# Patient Record
Sex: FEMALE | Race: OTHER | HISPANIC OR LATINO | URBAN - METROPOLITAN AREA
[De-identification: names, ages, dates, MRNs, and addresses within clinical notes are randomized per-mention and may not be internally consistent; named-entity substitution may affect disease eponyms.]

---

## 2021-02-18 ENCOUNTER — EMERGENCY (EMERGENCY)
Facility: HOSPITAL | Age: 36
LOS: 1 days | Discharge: HOME | End: 2021-02-18
Attending: EMERGENCY MEDICINE | Admitting: EMERGENCY MEDICINE
Payer: MEDICAID

## 2021-02-18 VITALS
HEART RATE: 71 BPM | SYSTOLIC BLOOD PRESSURE: 113 MMHG | DIASTOLIC BLOOD PRESSURE: 75 MMHG | OXYGEN SATURATION: 100 % | RESPIRATION RATE: 18 BRPM | TEMPERATURE: 98 F

## 2021-02-18 VITALS
HEIGHT: 61 IN | TEMPERATURE: 98 F | HEART RATE: 76 BPM | WEIGHT: 179.9 LBS | DIASTOLIC BLOOD PRESSURE: 77 MMHG | OXYGEN SATURATION: 97 % | SYSTOLIC BLOOD PRESSURE: 120 MMHG | RESPIRATION RATE: 18 BRPM
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
## 2021-02-18 DIAGNOSIS — R10.31 RIGHT LOWER QUADRANT PAIN: ICD-10-CM

## 2021-02-18 DIAGNOSIS — S39.011A STRAIN OF MUSCLE, FASCIA AND TENDON OF ABDOMEN, INITIAL ENCOUNTER: ICD-10-CM
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
## 2021-02-18 LAB
ALBUMIN SERPL ELPH-MCNC: 3.8 G/DL — SIGNIFICANT CHANGE UP (ref 3.4–5)
ALP SERPL-CCNC: 83 U/L — SIGNIFICANT CHANGE UP (ref 40–120)
ALT FLD-CCNC: 37 U/L — SIGNIFICANT CHANGE UP (ref 12–42)
ANION GAP SERPL CALC-SCNC: 9 MMOL/L — SIGNIFICANT CHANGE UP (ref 9–16)
APPEARANCE UR: CLEAR — SIGNIFICANT CHANGE UP
APTT BLD: 31.2 SEC — SIGNIFICANT CHANGE UP (ref 27.5–35.5)
AST SERPL-CCNC: 31 U/L — SIGNIFICANT CHANGE UP (ref 15–37)
BASOPHILS # BLD AUTO: 0.03 K/UL — SIGNIFICANT CHANGE UP (ref 0–0.2)
BASOPHILS NFR BLD AUTO: 0.4 % — SIGNIFICANT CHANGE UP (ref 0–2)
BILIRUB SERPL-MCNC: 0.3 MG/DL — SIGNIFICANT CHANGE UP (ref 0.2–1.2)
BILIRUB UR-MCNC: NEGATIVE — SIGNIFICANT CHANGE UP
BUN SERPL-MCNC: 14 MG/DL — SIGNIFICANT CHANGE UP (ref 7–23)
CALCIUM SERPL-MCNC: 9.3 MG/DL — SIGNIFICANT CHANGE UP (ref 8.5–10.5)
CHLORIDE SERPL-SCNC: 104 MMOL/L — SIGNIFICANT CHANGE UP (ref 96–108)
CO2 SERPL-SCNC: 24 MMOL/L — SIGNIFICANT CHANGE UP (ref 22–31)
COLOR SPEC: YELLOW — SIGNIFICANT CHANGE UP
CREAT SERPL-MCNC: 0.76 MG/DL — SIGNIFICANT CHANGE UP (ref 0.5–1.3)
DIFF PNL FLD: NEGATIVE — SIGNIFICANT CHANGE UP
EOSINOPHIL # BLD AUTO: 0.21 K/UL — SIGNIFICANT CHANGE UP (ref 0–0.5)
EOSINOPHIL NFR BLD AUTO: 2.5 % — SIGNIFICANT CHANGE UP (ref 0–6)
GLUCOSE SERPL-MCNC: 115 MG/DL — HIGH (ref 70–99)
GLUCOSE UR QL: NEGATIVE — SIGNIFICANT CHANGE UP
HCG UR QL: NEGATIVE — SIGNIFICANT CHANGE UP
HCT VFR BLD CALC: 40.2 % — SIGNIFICANT CHANGE UP (ref 39–50)
HGB BLD-MCNC: 13.4 G/DL — SIGNIFICANT CHANGE UP (ref 13–17)
IMM GRANULOCYTES NFR BLD AUTO: 0.2 % — SIGNIFICANT CHANGE UP (ref 0–1.5)
INR BLD: 0.94 — SIGNIFICANT CHANGE UP (ref 0.88–1.16)
KETONES UR-MCNC: NEGATIVE — SIGNIFICANT CHANGE UP
LEUKOCYTE ESTERASE UR-ACNC: NEGATIVE — SIGNIFICANT CHANGE UP
LYMPHOCYTES # BLD AUTO: 2.66 K/UL — SIGNIFICANT CHANGE UP (ref 1–3.3)
LYMPHOCYTES # BLD AUTO: 32.1 % — SIGNIFICANT CHANGE UP (ref 13–44)
MCHC RBC-ENTMCNC: 30 PG — SIGNIFICANT CHANGE UP (ref 27–34)
MCHC RBC-ENTMCNC: 33.3 GM/DL — SIGNIFICANT CHANGE UP (ref 32–36)
MCV RBC AUTO: 90.1 FL — SIGNIFICANT CHANGE UP (ref 80–100)
MONOCYTES # BLD AUTO: 0.48 K/UL — SIGNIFICANT CHANGE UP (ref 0–0.9)
MONOCYTES NFR BLD AUTO: 5.8 % — SIGNIFICANT CHANGE UP (ref 2–14)
NEUTROPHILS # BLD AUTO: 4.89 K/UL — SIGNIFICANT CHANGE UP (ref 1.8–7.4)
NEUTROPHILS NFR BLD AUTO: 59 % — SIGNIFICANT CHANGE UP (ref 43–77)
NITRITE UR-MCNC: NEGATIVE — SIGNIFICANT CHANGE UP
NRBC # BLD: 0 /100 WBCS — SIGNIFICANT CHANGE UP (ref 0–0)
PH UR: 6 — SIGNIFICANT CHANGE UP (ref 5–8)
PLATELET # BLD AUTO: 225 K/UL — SIGNIFICANT CHANGE UP (ref 150–400)
POTASSIUM SERPL-MCNC: 3.9 MMOL/L — SIGNIFICANT CHANGE UP (ref 3.5–5.3)
POTASSIUM SERPL-SCNC: 3.9 MMOL/L — SIGNIFICANT CHANGE UP (ref 3.5–5.3)
PROT SERPL-MCNC: 7.7 G/DL — SIGNIFICANT CHANGE UP (ref 6.4–8.2)
PROT UR-MCNC: NEGATIVE MG/DL — SIGNIFICANT CHANGE UP
PROTHROM AB SERPL-ACNC: 11.3 SEC — SIGNIFICANT CHANGE UP (ref 10.6–13.6)
RBC # BLD: 4.46 M/UL — SIGNIFICANT CHANGE UP (ref 4.2–5.8)
RBC # FLD: 12.5 % — SIGNIFICANT CHANGE UP (ref 10.3–14.5)
SODIUM SERPL-SCNC: 137 MMOL/L — SIGNIFICANT CHANGE UP (ref 132–145)
SP GR SPEC: 1.02 — SIGNIFICANT CHANGE UP (ref 1–1.03)
UROBILINOGEN FLD QL: 0.2 E.U./DL — SIGNIFICANT CHANGE UP
WBC # BLD: 8.29 K/UL — SIGNIFICANT CHANGE UP (ref 3.8–10.5)
WBC # FLD AUTO: 8.29 K/UL — SIGNIFICANT CHANGE UP (ref 3.8–10.5)

## 2021-02-18 PROCEDURE — 99285 EMERGENCY DEPT VISIT HI MDM: CPT

## 2021-02-18 PROCEDURE — 76856 US EXAM PELVIC COMPLETE: CPT | Mod: 26,59

## 2021-02-18 PROCEDURE — 74177 CT ABD & PELVIS W/CONTRAST: CPT | Mod: 26

## 2021-02-18 PROCEDURE — 76830 TRANSVAGINAL US NON-OB: CPT | Mod: 26

## 2021-02-18 RX ORDER — ACETAMINOPHEN 500 MG
650 TABLET ORAL ONCE
Refills: 0 | Status: COMPLETED | OUTPATIENT
Start: 2021-02-18 | End: 2021-02-18

## 2021-02-18 RX ORDER — ONDANSETRON 8 MG/1
4 TABLET, FILM COATED ORAL ONCE
Refills: 0 | Status: COMPLETED | OUTPATIENT
Start: 2021-02-18 | End: 2021-02-18

## 2021-02-18 RX ORDER — SODIUM CHLORIDE 9 MG/ML
1000 INJECTION INTRAMUSCULAR; INTRAVENOUS; SUBCUTANEOUS ONCE
Refills: 0 | Status: COMPLETED | OUTPATIENT
Start: 2021-02-18 | End: 2021-02-18

## 2021-02-18 RX ORDER — IOHEXOL 300 MG/ML
30 INJECTION, SOLUTION INTRAVENOUS ONCE
Refills: 0 | Status: COMPLETED | OUTPATIENT
Start: 2021-02-18 | End: 2021-02-18

## 2021-02-18 RX ADMIN — SODIUM CHLORIDE 1000 MILLILITER(S): 9 INJECTION INTRAMUSCULAR; INTRAVENOUS; SUBCUTANEOUS at 13:23

## 2021-02-18 RX ADMIN — ONDANSETRON 4 MILLIGRAM(S): 8 TABLET, FILM COATED ORAL at 13:23

## 2021-02-18 RX ADMIN — Medication 650 MILLIGRAM(S): at 13:23

## 2021-02-18 RX ADMIN — IOHEXOL 30 MILLILITER(S): 300 INJECTION, SOLUTION INTRAVENOUS at 13:09

## 2021-02-18 NOTE — ED PROVIDER NOTE - OBJECTIVE STATEMENT
35 year old Female with no known PMHx presents to the ED c/o a sudden onset of RLQ pain that began while she was at work a few hours ago. Pain is described sharp and constant, and rated a 7/10 in severity. She initially thought the pain was hunger related but she did eat this morning at 9am. She reports mild nausea but no vomiting, changes in bowel or bladder habit, fever, vaginal bleeding, vaginal discharge, and history of abdominal surgeries.

## 2021-02-18 NOTE — ED PROVIDER NOTE - NSFOLLOWUPINSTRUCTIONS_ED_ALL_ED_FT
[employer]® CareNotes®     :  [facility]  	                       ABDOMINAL PAIN - AfterCare(R) Instructions(ER/ED)           Abdominal Pain    WHAT YOU NEED TO KNOW:    Abdominal pain can be dull, achy, or sharp. You may have pain in one area of your abdomen, or in your entire abdomen. Your pain may be caused by a condition such as constipation, food sensitivity or poisoning, infection, or a blockage. Abdominal pain can also be from a hernia, appendicitis, or an ulcer. Liver, gallbladder, or kidney conditions can also cause abdominal pain. The cause of your abdominal pain may be unknown.     DISCHARGE INSTRUCTIONS:    Return to the emergency department if:   •You have new chest pain or shortness of breath.      •You have pulsing pain in your upper abdomen or lower back that suddenly becomes constant.      •Your pain is in the right lower abdominal area and worsens with movement.       •You have a fever over 100.4°F (38°C) or shaking chills.       •You are vomiting and cannot keep food or liquids down.       •Your pain does not improve or gets worse over the next 8 to 12 hours.       •You see blood in your vomit or bowel movements, or they look black and tarry.       •Your skin or the whites of your eyes turn yellow.       •You are a woman and have a large amount of vaginal bleeding that is not your monthly period.       Contact your healthcare provider if:   •You have pain in your lower back.       •You are a man and have pain in your testicles.      •You have pain when you urinate.       •You have questions or concerns about your condition or care.      Follow up with your healthcare provider within 24 hours or as directed: Write down your questions so you remember to ask them during your visits.     Medicines:   •Medicines may be given to calm your stomach and prevent vomiting or to decrease pain. Ask how to take pain medicine safely.      •Take your medicine as directed. Contact your healthcare provider if you think your medicine is not helping or if you have side effects. Tell him of her if you are allergic to any medicine. Keep a list of the medicines, vitamins, and herbs you take. Include the amounts, and when and why you take them. Bring the list or the pill bottles to follow-up visits. Carry your medicine list with you in case of an emergency.         © Copyright [employer] 2021           back to top                          © Copyright [employer] 2021

## 2021-02-18 NOTE — ED ADULT TRIAGE NOTE - CHIEF COMPLAINT QUOTE
Patient to ED with complaint of RLQ abdominal pain beginning 2 hours PTA with nausea.  Denies vomiting and diarrhea.  LMP 12 days PTA.  No acute distress

## 2021-02-18 NOTE — ED PROVIDER NOTE - PROGRESS NOTE DETAILS
Patient reports symptoms improved, US negative. Discussed CT findings with patient. Informed her of incidental liver findings and she understands and will let PCP know. Based on lab results, and imaging results,  likely abdominal wall muscle strain. Advised heat to site, tylenol and ibuprofen.

## 2021-02-18 NOTE — ED PROVIDER NOTE - PATIENT PORTAL LINK FT
You can access the FollowMyHealth Patient Portal offered by [facility] by registering at the following website: [email or web address]. By joining [employer]’s FollowMyHealth portal, you will also be able to view your health information using other applications (apps) compatible with our system.

## 2021-02-18 NOTE — ED PROVIDER NOTE - CLINICAL SUMMARY MEDICAL DECISION MAKING FREE TEXT BOX
35 year old Female with sudden set of RLQ pain. Concerned for appendicitis, will r/o appendicitis. If negative for appendicitis or other intraabdominal process will order US for a torsion. Offered pain medication but patient is only requesting for Tylenol.

## 2021-02-20 LAB
CULTURE RESULTS: SIGNIFICANT CHANGE UP
SPECIMEN SOURCE: SIGNIFICANT CHANGE UP

## 2021-04-19 ENCOUNTER — EMERGENCY (EMERGENCY)
Facility: HOSPITAL | Age: 36
LOS: 1 days | Discharge: HOME | End: 2021-04-19
Attending: EMERGENCY MEDICINE | Admitting: EMERGENCY MEDICINE
Payer: MEDICAID

## 2021-04-19 VITALS
OXYGEN SATURATION: 100 % | DIASTOLIC BLOOD PRESSURE: 69 MMHG | SYSTOLIC BLOOD PRESSURE: 110 MMHG | HEART RATE: 64 BPM | TEMPERATURE: 99 F | RESPIRATION RATE: 16 BRPM

## 2021-04-19 VITALS
DIASTOLIC BLOOD PRESSURE: 85 MMHG | SYSTOLIC BLOOD PRESSURE: 123 MMHG | OXYGEN SATURATION: 98 % | RESPIRATION RATE: 18 BRPM | TEMPERATURE: 99 F | WEIGHT: 179.9 LBS | HEIGHT: 61 IN | HEART RATE: 55 BPM

## 2021-04-19 DIAGNOSIS — R05 COUGH: ICD-10-CM

## 2021-04-19 DIAGNOSIS — J98.01 ACUTE BRONCHOSPASM: ICD-10-CM

## 2021-04-19 DIAGNOSIS — U07.1 COVID-19: ICD-10-CM

## 2021-04-19 LAB
FLUAV H1 2009 PAND RNA SPEC QL NAA+PROBE: SIGNIFICANT CHANGE UP
FLUAV H1 RNA SPEC QL NAA+PROBE: SIGNIFICANT CHANGE UP
FLUAV H3 RNA SPEC QL NAA+PROBE: SIGNIFICANT CHANGE UP
FLUAV SUBTYP SPEC NAA+PROBE: SIGNIFICANT CHANGE UP
FLUBV RNA SPEC QL NAA+PROBE: SIGNIFICANT CHANGE UP
HCOV PNL SPEC NAA+PROBE: DETECTED
RAPID RVP RESULT: DETECTED
SARS-COV-2 RNA SPEC QL NAA+PROBE: SIGNIFICANT CHANGE UP

## 2021-04-19 PROCEDURE — 99285 EMERGENCY DEPT VISIT HI MDM: CPT | Mod: 25

## 2021-04-19 PROCEDURE — 71046 X-RAY EXAM CHEST 2 VIEWS: CPT | Mod: 26

## 2021-04-19 PROCEDURE — 93010 ELECTROCARDIOGRAM REPORT: CPT

## 2021-04-19 RX ORDER — ALBUTEROL 90 UG/1
2 AEROSOL, METERED ORAL
Qty: 1 | Refills: 0
Start: 2021-04-19 | End: 2021-05-18

## 2021-04-19 RX ORDER — CETIRIZINE HYDROCHLORIDE 10 MG/1
1 TABLET ORAL
Qty: 7 | Refills: 0
Start: 2021-04-19 | End: 2021-04-25

## 2021-04-19 RX ORDER — FLUTICASONE PROPIONATE AND SALMETEROL 50; 250 UG/1; UG/1
1 POWDER ORAL; RESPIRATORY (INHALATION)
Qty: 1 | Refills: 0
Start: 2021-04-19 | End: 2021-05-18

## 2021-04-19 RX ORDER — LORATADINE 10 MG/1
10 TABLET ORAL ONCE
Refills: 0 | Status: COMPLETED | OUTPATIENT
Start: 2021-04-19 | End: 2021-04-19

## 2021-04-19 RX ORDER — MONTELUKAST 4 MG/1
10 TABLET, CHEWABLE ORAL ONCE
Refills: 0 | Status: COMPLETED | OUTPATIENT
Start: 2021-04-19 | End: 2021-04-19

## 2021-04-19 RX ORDER — IPRATROPIUM/ALBUTEROL SULFATE 18-103MCG
3 AEROSOL WITH ADAPTER (GRAM) INHALATION
Refills: 0 | Status: COMPLETED | OUTPATIENT
Start: 2021-04-19 | End: 2021-04-19

## 2021-04-19 RX ADMIN — Medication 3 MILLILITER(S): at 17:55

## 2021-04-19 RX ADMIN — Medication 60 MILLIGRAM(S): at 17:58

## 2021-04-19 RX ADMIN — MONTELUKAST 10 MILLIGRAM(S): 4 TABLET, CHEWABLE ORAL at 17:58

## 2021-04-19 RX ADMIN — Medication 3 MILLILITER(S): at 17:40

## 2021-04-19 RX ADMIN — LORATADINE 10 MILLIGRAM(S): 10 TABLET ORAL at 17:58

## 2021-04-19 RX ADMIN — Medication 3 MILLILITER(S): at 17:58

## 2021-04-19 NOTE — ED PROVIDER NOTE - OBJECTIVE STATEMENT
35 F presenting with hacking dry cough x 3 weeks. She has had more than one neg COVID test and neg COVID antibodies with her PMD. She used a family members Albuterol neb and took a benadryl, that helped a bit for a short time. No hx of atopy or asthma but has a fam hx of asthma. No cigs. No f/c. No SOB.

## 2021-04-19 NOTE — ED PROVIDER NOTE - PATIENT PORTAL LINK FT
You can access the FollowMyHealth Patient Portal offered by [facility] by registering at the following website: [email or web address]. By joining [employer]’s FollowMyHealth portal, you will also be able to view your health information using other applications (apps) compatible with our system.

## 2021-04-19 NOTE — ED PROVIDER NOTE - NSFOLLOWUPINSTRUCTIONS_ED_ALL_ED_FT
You appear to have bronchospasm (like asthma symptoms).    You also tested positive for a non COVID 19 coronavirus, this is like a common cold.    You might also have some seasonal allergies.     We are prescribing a short course of prednisone, Advair, cetirizine. The are antiinflammatories, antiallergy and medications to open the airways.    Please bring copies of your please to your PMD.

## 2021-04-19 NOTE — ED ADULT TRIAGE NOTE - CHIEF COMPLAINT QUOTE
Pt complaining of cough and chest tightness x3 weeks. Pt states that she tested negative for COVID on 4/2 and quarantined for 10 days. Pt was prescribed singular by PCP with no improvement.

## 2021-04-19 NOTE — ED PROVIDER NOTE - CLINICAL SUMMARY MEDICAL DECISION MAKING FREE TEXT BOX
Patient presenting with couch- bronchospastic. CXR clear. Labs consistent with non-COVID coronavirus. Better after nebs.

## 2021-04-19 NOTE — ED PROVIDER NOTE - PHYSICAL EXAMINATION
VITAL SIGNS: I have reviewed nursing notes and confirm.  CONSTITUTIONAL: Well-developed; well-nourished; in no acute distress.  SKIN: Skin is warm and dry, no acute rash.  HEAD: Normocephalic; atraumatic.  EYES: Pupils round bilaterally, 3mm; conjunctiva and sclera clear.  ENT: No nasal discharge; airway clear, MMM.  NECK: Supple; non tender.  CARD: S1, S2 normal; no murmurs, gallops, or rubs. Regular rate and rhythm.  RESP: No wheezes with regualr resp, + faint wheeze with cough, rales or rhonchi.  ABD: Soft; non-distended; non-tender; no rebound or guarding.  EXT: Normal ROM. No clubbing, cyanosis or edema.  NEURO: Alert, oriented. Grossly unremarkable. KENNEDY, normal tone, no gross motor or sensory changes. Fluent speech.   PSYCH: Cooperative, appropriate. Mood and affect wnl.

## 2021-04-19 NOTE — ED PROVIDER NOTE - PROGRESS NOTE DETAILS
Labs wnl and xrays wnl, feeling better, will d/c. RVP + for non COVID coronavirus. Will d/c on inhalers/pred and allergy meds.

## 2022-05-03 NOTE — ED PROVIDER NOTE - RADIATION
Chief Complaint:  Fina R Jhonny is here today for   Chief Complaint   Patient presents with   • Toe Pain        Medications: medications verified, no change  Refills needed today?  NO  denies Latex allergy or sensitivity  Tobacco history: verified  Health Maintenance Due   Topic Date Due   • COVID-19 Vaccine (3 - Booster for Pfizer series) 12/30/2021     Patient is due for topics as listed above but is not proceeding with Immunization(s) COVID-19 at this time..  Health Maintenance Summary     COVID-19 Vaccine (3 - Booster for Pfizer series)  Overdue since 12/30/2021    Cervical Cancer Screen 30-64 - (PAP/HPV Co-Testing - Every 5 Years)  Next due on 10/15/2025    DTaP/Tdap/Td Vaccine (4 - Td or Tdap)  Next due on 9/17/2026    Hepatitis B Vaccine   Aged Out    Influenza Vaccine   Completed    Meningococcal Vaccine   Aged Out    HPV Vaccine   Aged Out    Pneumococcal Vaccine 0-64   Aged Out          Patient would like communication of their results via:        Cell Phone:   Telephone Information:   Mobile [phone]     Okay to leave a message containing results? Yes   no radiation

## 2022-07-06 ENCOUNTER — EMERGENCY (EMERGENCY)
Facility: HOSPITAL | Age: 37
LOS: 1 days | Discharge: HOME | End: 2022-07-06
Admitting: EMERGENCY MEDICINE

## 2022-07-06 VITALS
SYSTOLIC BLOOD PRESSURE: 113 MMHG | RESPIRATION RATE: 17 BRPM | OXYGEN SATURATION: 96 % | HEART RATE: 62 BPM | DIASTOLIC BLOOD PRESSURE: 80 MMHG | TEMPERATURE: 98 F

## 2022-07-06 VITALS
SYSTOLIC BLOOD PRESSURE: 126 MMHG | TEMPERATURE: 98 F | DIASTOLIC BLOOD PRESSURE: 81 MMHG | RESPIRATION RATE: 18 BRPM | HEIGHT: 61 IN | HEART RATE: 75 BPM | OXYGEN SATURATION: 97 %

## 2022-07-06 LAB
ALBUMIN SERPL ELPH-MCNC: 4 G/DL — SIGNIFICANT CHANGE UP (ref 3.4–5)
ALP SERPL-CCNC: 65 U/L — SIGNIFICANT CHANGE UP (ref 40–120)
ALT FLD-CCNC: 31 U/L — SIGNIFICANT CHANGE UP (ref 12–42)
ANION GAP SERPL CALC-SCNC: 9 MMOL/L — SIGNIFICANT CHANGE UP (ref 9–16)
APPEARANCE UR: CLEAR — SIGNIFICANT CHANGE UP
APTT BLD: 30.4 SEC — SIGNIFICANT CHANGE UP (ref 27.5–35.5)
AST SERPL-CCNC: 21 U/L — SIGNIFICANT CHANGE UP (ref 15–37)
BACTERIA # UR AUTO: PRESENT /HPF
BASOPHILS # BLD AUTO: 0.01 K/UL — SIGNIFICANT CHANGE UP (ref 0–0.2)
BASOPHILS NFR BLD AUTO: 0.1 % — SIGNIFICANT CHANGE UP (ref 0–2)
BILIRUB SERPL-MCNC: 0.5 MG/DL — SIGNIFICANT CHANGE UP (ref 0.2–1.2)
BILIRUB UR-MCNC: NEGATIVE — SIGNIFICANT CHANGE UP
BUN SERPL-MCNC: 15 MG/DL — SIGNIFICANT CHANGE UP (ref 7–23)
CALCIUM SERPL-MCNC: 9.2 MG/DL — SIGNIFICANT CHANGE UP (ref 8.5–10.5)
CHLORIDE SERPL-SCNC: 105 MMOL/L — SIGNIFICANT CHANGE UP (ref 96–108)
CO2 SERPL-SCNC: 24 MMOL/L — SIGNIFICANT CHANGE UP (ref 22–31)
COD CRY URNS QL: ABNORMAL /HPF
COLOR SPEC: YELLOW — SIGNIFICANT CHANGE UP
CREAT SERPL-MCNC: 0.68 MG/DL — SIGNIFICANT CHANGE UP (ref 0.5–1.3)
DIFF PNL FLD: ABNORMAL
EGFR: 116 ML/MIN/1.73M2 — SIGNIFICANT CHANGE UP
EOSINOPHIL # BLD AUTO: 0.08 K/UL — SIGNIFICANT CHANGE UP (ref 0–0.5)
EOSINOPHIL NFR BLD AUTO: 0.8 % — SIGNIFICANT CHANGE UP (ref 0–6)
EPI CELLS # UR: SIGNIFICANT CHANGE UP /HPF (ref 0–5)
GLUCOSE SERPL-MCNC: 85 MG/DL — SIGNIFICANT CHANGE UP (ref 70–99)
GLUCOSE UR QL: NEGATIVE — SIGNIFICANT CHANGE UP
HCG SERPL-ACNC: <1 MIU/ML — SIGNIFICANT CHANGE UP
HCT VFR BLD CALC: 40.3 % — SIGNIFICANT CHANGE UP (ref 34.5–45)
HGB BLD-MCNC: 13.6 G/DL — SIGNIFICANT CHANGE UP (ref 11.5–15.5)
IMM GRANULOCYTES NFR BLD AUTO: 0.3 % — SIGNIFICANT CHANGE UP (ref 0–1.5)
INR BLD: 0.97 — SIGNIFICANT CHANGE UP (ref 0.88–1.16)
KETONES UR-MCNC: 15 MG/DL
LEUKOCYTE ESTERASE UR-ACNC: NEGATIVE — SIGNIFICANT CHANGE UP
LYMPHOCYTES # BLD AUTO: 2.31 K/UL — SIGNIFICANT CHANGE UP (ref 1–3.3)
LYMPHOCYTES # BLD AUTO: 23.4 % — SIGNIFICANT CHANGE UP (ref 13–44)
MCHC RBC-ENTMCNC: 31.2 PG — SIGNIFICANT CHANGE UP (ref 27–34)
MCHC RBC-ENTMCNC: 33.7 GM/DL — SIGNIFICANT CHANGE UP (ref 32–36)
MCV RBC AUTO: 92.4 FL — SIGNIFICANT CHANGE UP (ref 80–100)
MONOCYTES # BLD AUTO: 0.6 K/UL — SIGNIFICANT CHANGE UP (ref 0–0.9)
MONOCYTES NFR BLD AUTO: 6.1 % — SIGNIFICANT CHANGE UP (ref 2–14)
NEUTROPHILS # BLD AUTO: 6.86 K/UL — SIGNIFICANT CHANGE UP (ref 1.8–7.4)
NEUTROPHILS NFR BLD AUTO: 69.3 % — SIGNIFICANT CHANGE UP (ref 43–77)
NITRITE UR-MCNC: NEGATIVE — SIGNIFICANT CHANGE UP
NRBC # BLD: 0 /100 WBCS — SIGNIFICANT CHANGE UP (ref 0–0)
PH UR: 6 — SIGNIFICANT CHANGE UP (ref 5–8)
PLATELET # BLD AUTO: 238 K/UL — SIGNIFICANT CHANGE UP (ref 150–400)
POTASSIUM SERPL-MCNC: 3.5 MMOL/L — SIGNIFICANT CHANGE UP (ref 3.5–5.3)
POTASSIUM SERPL-SCNC: 3.5 MMOL/L — SIGNIFICANT CHANGE UP (ref 3.5–5.3)
PROT SERPL-MCNC: 7.7 G/DL — SIGNIFICANT CHANGE UP (ref 6.4–8.2)
PROT UR-MCNC: NEGATIVE MG/DL — SIGNIFICANT CHANGE UP
PROTHROM AB SERPL-ACNC: 11.3 SEC — SIGNIFICANT CHANGE UP (ref 10.5–13.4)
RBC # BLD: 4.36 M/UL — SIGNIFICANT CHANGE UP (ref 3.8–5.2)
RBC # FLD: 12.8 % — SIGNIFICANT CHANGE UP (ref 10.3–14.5)
RBC CASTS # UR COMP ASSIST: < 5 /HPF — SIGNIFICANT CHANGE UP
SODIUM SERPL-SCNC: 138 MMOL/L — SIGNIFICANT CHANGE UP (ref 132–145)
SP GR SPEC: >=1.03 — SIGNIFICANT CHANGE UP (ref 1–1.03)
UROBILINOGEN FLD QL: 0.2 E.U./DL — SIGNIFICANT CHANGE UP
WBC # BLD: 9.89 K/UL — SIGNIFICANT CHANGE UP (ref 3.8–10.5)
WBC # FLD AUTO: 9.89 K/UL — SIGNIFICANT CHANGE UP (ref 3.8–10.5)
WBC UR QL: < 5 /HPF — SIGNIFICANT CHANGE UP

## 2022-07-06 PROCEDURE — 76856 US EXAM PELVIC COMPLETE: CPT | Mod: 26,59

## 2022-07-06 PROCEDURE — 99284 EMERGENCY DEPT VISIT MOD MDM: CPT

## 2022-07-06 PROCEDURE — 76830 TRANSVAGINAL US NON-OB: CPT | Mod: 26

## 2022-07-06 NOTE — ED PROVIDER NOTE - OBJECTIVE STATEMENT
38 yo F, denies pmhx, presenting w/ vaginal spotting and pelvic pain x 2 days. Pt states the pelvic pain is L sided. SHe has had similar symptoms in the past and has seen GYN for this issue, but not in the past few months. Pt denies nausea, vomiting, upper abdominal pain, urinary symptoms, headaches, dizziness, back pain, or chills.  LMP 2 weeks ago.

## 2022-07-06 NOTE — ED PROVIDER NOTE - CLINICAL SUMMARY MEDICAL DECISION MAKING FREE TEXT BOX
38 yo F, denies pmhx, presenting w/ vaginal spotting and pelvic pain x 2 days. Patient found to have L adnexal ttp on exam. Will obtain medical labs and send for pelvic US. Dispo pending medical w/u.

## 2022-07-06 NOTE — ED ADULT NURSE NOTE - OBJECTIVE STATEMENT
c/o abdominal cramping and vag spotting x 2 days. pt appears comfortable, in NAD. will continue to monitor. awaiting provider eval. LMP 2 weeks ago

## 2022-07-06 NOTE — ED PROVIDER NOTE - PROGRESS NOTE DETAILS
US shows free fluid in pelvis likely indicating ruptured ovarian cyst. Results d/w patient and she is given copy of report. She will f/u w/ her GYN. Return precautions discussed. Pt stable on DC.

## 2022-07-06 NOTE — ED ADULT TRIAGE NOTE - CHIEF COMPLAINT QUOTE
Pt walks in c/o R flank pain with vaginal spotting/hematuria for several days. Pt also c/o headache. LMP 6/18

## 2022-07-06 NOTE — ED PROVIDER NOTE - PATIENT PORTAL LINK FT
You can access the FollowMyHealth Patient Portal offered by [facility] by registering at the following website: [email or web address]. By joining [employer]’s FollowMyHealth portal, you will also be able to view your health information using other applications (apps) compatible with our system.

## 2022-07-06 NOTE — ED PROVIDER NOTE - NSFOLLOWUPINSTRUCTIONS_ED_ALL_ED_FT
Ovarian Cyst       An ovarian cyst is a fluid-filled sac that forms on an ovary. The ovaries are small organs that produce eggs in women. Various types of cysts can form on the ovaries. Some may cause symptoms and require treatment. Most ovarian cysts go away on their own, are not cancerous (are benign), and do not cause problems.      What are the causes?    Ovarian cysts may be caused by:  •Ovarian hyperstimulation syndrome. This is a condition that can develop from taking fertility medicines. It causes multiple large ovarian cysts to form.      •Polycystic ovarian syndrome (PCOS). This is a common hormonal disorder that can cause ovarian cysts to form, and can cause problems with your period or fertility.      •The normal menstrual cycle.        What increases the risk?    The following factors may make you more likely to develop this condition:  •Being overweight or obese.      •Taking fertility medicines.      •Taking certain forms of hormonal birth control.      •Smoking.        What are the signs or symptoms?    Many ovarian cysts do not cause symptoms. If symptoms are present, they may include:  •Pelvic pain or pressure.      •Pain in the lower abdomen.      •Pain during sex.      •Abdominal swelling.      •Abnormal menstrual periods.      •Increasing pain with menstrual periods.        How is this diagnosed?    These cysts are commonly found during a routine pelvic exam. You may have tests to find out more about the cyst, such as:  •Ultrasound.      •CT scan.      •MRI.      •Blood tests.        How is this treated?    Many ovarian cysts go away on their own without treatment. Your health care provider may want to check your cyst regularly for 2–3 months to see if it changes. If you are in menopause, it is especially important to have your cyst monitored closely because menopausal women have a higher rate of ovarian cancer.    When treatment is needed, it may include:  •Medicines to help relieve pain.      •A procedure to drain the cyst (aspiration).      •Surgery to remove the whole cyst (cystectomy).      •Hormone treatment or birth control pills. These methods are sometimes used to help keep cysts from coming back.      •Surgery to remove the ovary (oophorectomy).        Follow these instructions at home:    •Take over-the-counter and prescription medicines only as told by your health care provider.      •Ask your health care provider if any medicine prescribed to you requires you to avoid driving or using machinery.      •Get regular pelvic exams and Pap tests as often as told by your health care provider.      •Return to your normal activities as told by your health care provider. Ask your health care provider what activities are safe for you.      • Do not use any products that contain nicotine or tobacco, such as cigarettes, e-cigarettes, and chewing tobacco. If you need help quitting, ask your health care provider.      •Keep all follow-up visits. This is important.        Contact a health care provider if:    •Your periods are late, irregular, painful, or they stop.      •You have pelvic pain that does not go away.      •You have pressure on your bladder or trouble emptying your bladder completely.    •You have any of the following:  •A feeling of fullness.      •You are gaining weight or losing weight without changing your exercise and eating habits.      •Pain, swelling, or bloating in the abdomen.      •Loss of appetite.      •Pain and pressure in your back and pelvis.        •You think you may be pregnant.        Get help right away if:    •You have abdominal or pelvic pain that is severe or gets worse.      •You cannot eat or drink without vomiting.      •You suddenly develop a fever or chills.      •Your menstrual period is much heavier than usual.        Summary    •An ovarian cyst is a fluid-filled sac that forms on an ovary.      •Some ovarian cysts may cause symptoms and require treatment.      •These cysts are commonly found during a routine pelvic exam.      •Many ovarian cysts go away on their own without treatment.      This information is not intended to replace advice given to you by your health care provider. Make sure you discuss any questions you have with your health care provider.

## 2022-07-07 DIAGNOSIS — R10.2 PELVIC AND PERINEAL PAIN: ICD-10-CM

## 2022-07-07 DIAGNOSIS — N83.202 UNSPECIFIED OVARIAN CYST, LEFT SIDE: ICD-10-CM

## 2022-07-07 DIAGNOSIS — N83.201 UNSPECIFIED OVARIAN CYST, RIGHT SIDE: ICD-10-CM

## 2022-07-07 PROBLEM — Z78.9 OTHER SPECIFIED HEALTH STATUS: Chronic | Status: ACTIVE | Noted: 2021-02-18

## 2022-07-08 LAB
CULTURE RESULTS: SIGNIFICANT CHANGE UP
SPECIMEN SOURCE: SIGNIFICANT CHANGE UP

## 2022-07-25 ENCOUNTER — EMERGENCY (EMERGENCY)
Facility: HOSPITAL | Age: 37
LOS: 1 days | Discharge: HOME | End: 2022-07-25
Admitting: EMERGENCY MEDICINE

## 2022-07-25 VITALS
TEMPERATURE: 98 F | HEART RATE: 60 BPM | SYSTOLIC BLOOD PRESSURE: 109 MMHG | HEIGHT: 61 IN | RESPIRATION RATE: 16 BRPM | OXYGEN SATURATION: 99 % | WEIGHT: 179.9 LBS | DIASTOLIC BLOOD PRESSURE: 76 MMHG

## 2022-07-25 LAB
ALBUMIN SERPL ELPH-MCNC: 3.6 G/DL — SIGNIFICANT CHANGE UP (ref 3.4–5)
ALP SERPL-CCNC: 66 U/L — SIGNIFICANT CHANGE UP (ref 40–120)
ALT FLD-CCNC: 22 U/L — SIGNIFICANT CHANGE UP (ref 12–42)
ANION GAP SERPL CALC-SCNC: 5 MMOL/L — LOW (ref 9–16)
APPEARANCE UR: CLEAR — SIGNIFICANT CHANGE UP
AST SERPL-CCNC: 18 U/L — SIGNIFICANT CHANGE UP (ref 15–37)
BASOPHILS # BLD AUTO: 0.02 K/UL — SIGNIFICANT CHANGE UP (ref 0–0.2)
BASOPHILS NFR BLD AUTO: 0.3 % — SIGNIFICANT CHANGE UP (ref 0–2)
BILIRUB SERPL-MCNC: 0.5 MG/DL — SIGNIFICANT CHANGE UP (ref 0.2–1.2)
BILIRUB UR-MCNC: ABNORMAL
BUN SERPL-MCNC: 17 MG/DL — SIGNIFICANT CHANGE UP (ref 7–23)
CALCIUM SERPL-MCNC: 8.7 MG/DL — SIGNIFICANT CHANGE UP (ref 8.5–10.5)
CHLORIDE SERPL-SCNC: 107 MMOL/L — SIGNIFICANT CHANGE UP (ref 96–108)
CO2 SERPL-SCNC: 27 MMOL/L — SIGNIFICANT CHANGE UP (ref 22–31)
COLOR SPEC: YELLOW — SIGNIFICANT CHANGE UP
CREAT SERPL-MCNC: 0.74 MG/DL — SIGNIFICANT CHANGE UP (ref 0.5–1.3)
DIFF PNL FLD: NEGATIVE — SIGNIFICANT CHANGE UP
EGFR: 108 ML/MIN/1.73M2 — SIGNIFICANT CHANGE UP
EOSINOPHIL # BLD AUTO: 0.11 K/UL — SIGNIFICANT CHANGE UP (ref 0–0.5)
EOSINOPHIL NFR BLD AUTO: 1.7 % — SIGNIFICANT CHANGE UP (ref 0–6)
GLUCOSE SERPL-MCNC: 94 MG/DL — SIGNIFICANT CHANGE UP (ref 70–99)
GLUCOSE UR QL: NEGATIVE — SIGNIFICANT CHANGE UP
HCG UR QL: NEGATIVE — SIGNIFICANT CHANGE UP
HCT VFR BLD CALC: 36.5 % — SIGNIFICANT CHANGE UP (ref 34.5–45)
HGB BLD-MCNC: 12.2 G/DL — SIGNIFICANT CHANGE UP (ref 11.5–15.5)
IMM GRANULOCYTES NFR BLD AUTO: 0.2 % — SIGNIFICANT CHANGE UP (ref 0–1.5)
KETONES UR-MCNC: ABNORMAL MG/DL
LEUKOCYTE ESTERASE UR-ACNC: NEGATIVE — SIGNIFICANT CHANGE UP
LIDOCAIN IGE QN: 46 U/L — LOW (ref 73–393)
LYMPHOCYTES # BLD AUTO: 2.16 K/UL — SIGNIFICANT CHANGE UP (ref 1–3.3)
LYMPHOCYTES # BLD AUTO: 32.4 % — SIGNIFICANT CHANGE UP (ref 13–44)
MCHC RBC-ENTMCNC: 31.1 PG — SIGNIFICANT CHANGE UP (ref 27–34)
MCHC RBC-ENTMCNC: 33.4 GM/DL — SIGNIFICANT CHANGE UP (ref 32–36)
MCV RBC AUTO: 93.1 FL — SIGNIFICANT CHANGE UP (ref 80–100)
MONOCYTES # BLD AUTO: 0.51 K/UL — SIGNIFICANT CHANGE UP (ref 0–0.9)
MONOCYTES NFR BLD AUTO: 7.7 % — SIGNIFICANT CHANGE UP (ref 2–14)
NEUTROPHILS # BLD AUTO: 3.85 K/UL — SIGNIFICANT CHANGE UP (ref 1.8–7.4)
NEUTROPHILS NFR BLD AUTO: 57.7 % — SIGNIFICANT CHANGE UP (ref 43–77)
NITRITE UR-MCNC: NEGATIVE — SIGNIFICANT CHANGE UP
NRBC # BLD: 0 /100 WBCS — SIGNIFICANT CHANGE UP (ref 0–0)
PH UR: 6 — SIGNIFICANT CHANGE UP (ref 5–8)
PLATELET # BLD AUTO: 243 K/UL — SIGNIFICANT CHANGE UP (ref 150–400)
POTASSIUM SERPL-MCNC: 3.7 MMOL/L — SIGNIFICANT CHANGE UP (ref 3.5–5.3)
POTASSIUM SERPL-SCNC: 3.7 MMOL/L — SIGNIFICANT CHANGE UP (ref 3.5–5.3)
PROT SERPL-MCNC: 6.6 G/DL — SIGNIFICANT CHANGE UP (ref 6.4–8.2)
PROT UR-MCNC: NEGATIVE MG/DL — SIGNIFICANT CHANGE UP
RBC # BLD: 3.92 M/UL — SIGNIFICANT CHANGE UP (ref 3.8–5.2)
RBC # FLD: 13 % — SIGNIFICANT CHANGE UP (ref 10.3–14.5)
SODIUM SERPL-SCNC: 139 MMOL/L — SIGNIFICANT CHANGE UP (ref 132–145)
SP GR SPEC: >=1.03 — SIGNIFICANT CHANGE UP (ref 1–1.03)
UROBILINOGEN FLD QL: 0.2 E.U./DL — SIGNIFICANT CHANGE UP
WBC # BLD: 6.66 K/UL — SIGNIFICANT CHANGE UP (ref 3.8–10.5)
WBC # FLD AUTO: 6.66 K/UL — SIGNIFICANT CHANGE UP (ref 3.8–10.5)

## 2022-07-25 PROCEDURE — 99284 EMERGENCY DEPT VISIT MOD MDM: CPT

## 2022-07-25 PROCEDURE — 76705 ECHO EXAM OF ABDOMEN: CPT | Mod: 26

## 2022-07-25 RX ORDER — ONDANSETRON 8 MG/1
8 TABLET, FILM COATED ORAL ONCE
Refills: 0 | Status: COMPLETED | OUTPATIENT
Start: 2022-07-25 | End: 2022-07-25

## 2022-07-25 RX ORDER — ONDANSETRON 8 MG/1
1 TABLET, FILM COATED ORAL
Qty: 12 | Refills: 0
Start: 2022-07-25 | End: 2022-07-27

## 2022-07-25 RX ORDER — SODIUM CHLORIDE 9 MG/ML
1000 INJECTION INTRAMUSCULAR; INTRAVENOUS; SUBCUTANEOUS ONCE
Refills: 0 | Status: COMPLETED | OUTPATIENT
Start: 2022-07-25 | End: 2022-07-25

## 2022-07-25 RX ADMIN — SODIUM CHLORIDE 1000 MILLILITER(S): 9 INJECTION INTRAMUSCULAR; INTRAVENOUS; SUBCUTANEOUS at 12:06

## 2022-07-25 RX ADMIN — ONDANSETRON 8 MILLIGRAM(S): 8 TABLET, FILM COATED ORAL at 12:06

## 2022-07-25 NOTE — ED PROVIDER NOTE - CLINICAL SUMMARY MEDICAL DECISION MAKING FREE TEXT BOX
38 y/o Female with upper abdominal discomfort with nausea and vomiting after eating an egg sandwich. Most likely gastritis vs viral gastroenteritis. Given mild tenderness to epigastrium and upper quadrant will obtain abdomen ultrasound, check labs, IV fluids, antiemetics, and reassess. 38 y/o Female with upper abdominal discomfort with nausea and vomiting after eating an egg sandwich earlier today. Most likely gastritis vs viral gastroenteritis. looks well, no vitals derangments. Given mild tenderness to epigastrium and right upper quadrant, will obtain abdominal ultrasound, labs, IV fluids, antiemetics, and reassess.

## 2022-07-25 NOTE — ED PROVIDER NOTE - OBJECTIVE STATEMENT
36 y/o Female with PMHx of anxiety on Lexapro and a  complaining of vomiting x4. Patient states that she had an upset stomach and nausea after eating an egg sandwich for breakfast. Denies abdominal pain, fever, diarrhea, chest pain, or SOB. 38 y/o Female with PMHx of anxiety on Lexapro, shx C section, c/o vomiting x 4 episodes today, NBNB Patient states that she had an upset stomach and nausea shortly after eating an egg sandwich for breakfast. Denies abdominal pain, fever, diarrhea, chest pain, or SOB.

## 2022-07-25 NOTE — ED PROVIDER NOTE - PHYSICAL EXAMINATION
CONSTITUTIONAL: Well-appearing; well-nourished; in no apparent distress.   HEAD: Normocephalic; atraumatic.   ENT: normal nose; no rhinorrhea; normal pharynx with no erythema or lesions.   GI: Soft; non-distended; non-tender; no CVA tenderness. Mild epigastric and RUQ tenderness.  SKIN: Normal for age and race; warm; dry; good turgor; no apparent lesions or rash.   NEURO: A & O x 3; face symmetric; grossly unremarkable.   PSYCHOLOGICAL: The patient’s mood and manner are appropriate. CONSTITUTIONAL: Well-appearing; well-nourished; in no apparent distress.   HEAD: Normocephalic; atraumatic.   ENT: airway patent  Cardiac: RRR, +s1, s2  Resp: CTA  GI: Soft; non-distended; non-tender; no CVA tenderness bilaterally. Mild epigastric and RUQ tenderness. no guarding or rebound.   SKIN: Normal for age and race; warm; dry; good turgor; no apparent lesions or rash.   NEURO: A & O x 3; face symmetric; grossly unremarkable.   PSYCHOLOGICAL: The patient’s mood and manner are appropriate.

## 2022-07-25 NOTE — ED ADULT TRIAGE NOTE - CHIEF COMPLAINT QUOTE
Pt walked in. Vomited shortly after eating this morning. Reports vomiting 4-5 times and still experiencing malaise.

## 2022-07-25 NOTE — ED PROVIDER NOTE - PROGRESS NOTE DETAILS
patient feeling better. tolerating po. US neg for acute pathology, no electrolyte disturbances. repeat abdominal exam soft nondistended nontender. patient wishing to be discharged, advised hydration, bland diet, rx zofran several doses prn, strict return precautions discussed. patient agrees with plan, will dc.

## 2022-07-25 NOTE — ED PROVIDER NOTE - NSFOLLOWUPINSTRUCTIONS_ED_ALL_ED_FT
Drink plenty of fluids  Zofran as needed for nausea  [place] diet, advance slowly as tolerated    Follow up with your doctor  Follow up with your primary care doctor or clinics listed below if you do not have a doctor  [address] [address]  To make an appointment, call [phone]  [facility], [place]  Address: [address] [place] [id number]  Appointment Center: [phone] ([phone])     Return for worsening symptoms such as worsening abdominal pain, fever, persistent vomiting, or any concerning or worsening symptoms.

## 2022-07-25 NOTE — ED PROVIDER NOTE - PATIENT PORTAL LINK FT
You can access the FollowMyHealth Patient Portal offered by [facility] by registering at the following website: [email or web address]. By joining [employer]’s FollowMyHealth portal, you will also be able to view your health information using other applications (apps) compatible with our system.

## 2022-07-25 NOTE — ED ADULT NURSE NOTE - OBJECTIVE STATEMENT
Pt ate a breakfast sandwich from a deli which she has been before. Approx 20 min after she threw up, has since thrown up 4 times. Pt reports hx of 1 , unsure of chance of pregnancy at present. Remains with nausea and periumbilical pain but no active emesis.

## 2022-07-26 DIAGNOSIS — R11.2 NAUSEA WITH VOMITING, UNSPECIFIED: ICD-10-CM

## 2022-07-26 DIAGNOSIS — R10.10 UPPER ABDOMINAL PAIN, UNSPECIFIED: ICD-10-CM

## 2022-07-26 DIAGNOSIS — F41.9 ANXIETY DISORDER, UNSPECIFIED: ICD-10-CM
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
## 2023-02-12 ENCOUNTER — EMERGENCY (EMERGENCY)
Facility: HOSPITAL | Age: 38
LOS: 1 days | Discharge: HOME | End: 2023-02-12
Admitting: EMERGENCY MEDICINE
Payer: MEDICAID

## 2023-02-12 VITALS
HEART RATE: 72 BPM | WEIGHT: 259.93 LBS | TEMPERATURE: 99 F | HEIGHT: 65 IN | OXYGEN SATURATION: 99 % | SYSTOLIC BLOOD PRESSURE: 118 MMHG | DIASTOLIC BLOOD PRESSURE: 78 MMHG | RESPIRATION RATE: 18 BRPM

## 2023-02-12 PROBLEM — F41.9 ANXIETY DISORDER, UNSPECIFIED: Chronic | Status: ACTIVE | Noted: 2022-07-25

## 2023-02-12 LAB
APPEARANCE UR: CLEAR — SIGNIFICANT CHANGE UP
BILIRUB UR-MCNC: NEGATIVE — SIGNIFICANT CHANGE UP
COLOR SPEC: YELLOW — SIGNIFICANT CHANGE UP
DIFF PNL FLD: NEGATIVE — SIGNIFICANT CHANGE UP
GLUCOSE UR QL: NEGATIVE — SIGNIFICANT CHANGE UP
HCG UR QL: NEGATIVE — SIGNIFICANT CHANGE UP
HIV 1 & 2 AB SERPL IA.RAPID: SIGNIFICANT CHANGE UP
KETONES UR-MCNC: NEGATIVE — SIGNIFICANT CHANGE UP
LEUKOCYTE ESTERASE UR-ACNC: NEGATIVE — SIGNIFICANT CHANGE UP
NITRITE UR-MCNC: NEGATIVE — SIGNIFICANT CHANGE UP
PH UR: 8.5 — HIGH (ref 5–8)
PROT UR-MCNC: NEGATIVE MG/DL — SIGNIFICANT CHANGE UP
SP GR SPEC: 1.01 — SIGNIFICANT CHANGE UP (ref 1–1.03)
UROBILINOGEN FLD QL: 0.2 E.U./DL — SIGNIFICANT CHANGE UP

## 2023-02-12 PROCEDURE — 99284 EMERGENCY DEPT VISIT MOD MDM: CPT

## 2023-02-12 NOTE — ED PROVIDER NOTE - NSFOLLOWUPINSTRUCTIONS_ED_ALL_ED_FT
hat are sexually transmitted infections?  Sexually transmitted infections, often called STIs, are infections you can catch during sex. They are also called sexually transmitted diseases, or STDs. Most STIs are caused by bacteria or viruses.    The most common STIs include:    ?Chlamydia    ?Gonorrhea    ?Mycoplasma genitalium    ?Genital herpes, also called "herpes simplex virus" or "HSV"    ?Genital warts, also called "human papillomavirus" or "HPV" – Some types of HPV can cause cancer of the cervix, penis, or anus.    ?Hepatitis B    ?Syphilis    ?Trichomoniasis    ?Human immunodeficiency virus, also called "HIV" – This is the virus that causes AIDS.    Many of these infections can be transmitted through any type of sex. That includes not just vaginal or anal sex, but also oral sex and other types of sex play. HIV and hepatitis B can be transmitted in other ways, too, such as exposure to body fluids.    Hepatitis A and hepatitis C are other types of hepatitis viruses. They are usually transmitted in other ways, but can also be transmitted through sex.    Zika virus is usually spread through mosquito bites. But it can also be transmitted through sex.    What is STI screening?  STI screening includes a series of tests that doctors use to find out if a person has any STIs. STIs often don't cause any symptoms. People can have STIs and not know it. That's what makes screening so important.    Doctors recommend that people who are at risk for STIs be screened even if they have no symptoms and feel fine. For example, you could be at risk for chlamydia if you had unprotected sex with a new partner. Screening for chlamydia will alert your doctor if you do have this infection, so you can get treatment. Treatment will prevent the infection from getting worse and keep you from infecting other people.    There are different types of tests that screen for different infections. Many STIs can be found through a blood or urine test. If you decide to be screened for STIs, your doctor or nurse can work with you to figure out which specific tests you need.    Who should be screened for STIs?  Different screening tests are appropriate for different people, depending on their gender and sex habits.    ?All people (including teenagers) should get screened at least once for HIV.    ?All females younger than 25 years who have had sex should be screened every year for gonorrhea and chlamydia.    ?Females older than 25 who have sex with more than 1 partner and do not use condoms should be screened every year for gonorrhea and chlamydia.    ?Males who have sex with males should be screened at least once a year for HIV, syphilis, chlamydia, and gonorrhea. This should involve testing of any body parts that could be infected, including the rectum. Males who have sex with males should also be screened at least once for hepatitis A, B, and C.    ?Pregnant people should be screened for syphilis, HIV, and hepatitis B. They should also be screened for chlamydia and gonorrhea if they are younger than 25 years old or are having sex with more than 1 partner. Some pregnant people might also need to be screened for other infections depending on their sex habits.    ?All people who are infected with HIV should be screened at least once for hepatitis A, B, and C. They should also be screened at least once a year for syphilis, chlamydia, and gonorrhea. Females who are infected with HIV should be screened at least once a year for trichomonas. Males who are infected with HIV, and who have sex with males with HIV, should be screened at least once a year for hepatitis C.    ?Transgender and gender-diverse people should be screened based on their anatomy and sex habits.    The list above includes some general guidelines, but some people might need other screening tests depending on their sex habits and other factors. If you are unsure whether you should be screened and for what, ask your doctor or nurse for advice.    Where can I get screened?  If you have a doctor or nurse you see regularly, they should be able to screen you. But if you prefer to have screening done without your regular doctor or nurse, or if you don't have one, you can go to a clinic. To find a clinic near you, check with your local Department of Health or visit [email or web address]. Some clinics let you get screened without giving your name (anonymously).    Be careful with any pharmacies or online stores that offer to sell you kits to use at home to screen for STIs. For some of these tests, you turn in or mail away a sample, and then you get the results either by phone or online. For others, you do a test at home and get results within an hour. But it is not always clear which test kits are ones you can trust. If you do use one of these kits and get a positive result, be sure to follow up with a doctor or nurse. And if you get a negative result but think you might have an infection, see a doctor or nurse.    What symptoms should I watch for?  In general, watch out for any genital itching, burning, sores, or discharge. But be aware that many STIs do not cause any symptoms. The best way to know for sure if you have an STI is to be screened.    What if I have an STI?  If you have an STI, you will need treatment. The right treatment will depend on the type of STI you have. Treatment might include antibiotics or medicines called antivirals, which fight viruses. Treatment can cure your infection or keep it from getting worse. It can also reduce the chance that you spread your infection to others.    If you do have an infection, you might need to tell the people you could have infected. Your doctor or nurse can help you figure out which partners you need to tell based on when you last had sex with them.    Can STIs be prevented?  There is no definite way to prevent all STIs. But there are things you can do to lower your chances of catching one:    ?The most important thing you can do is to use a condom every time you have sex. Both external (male) and internal (female) condoms can protect against STIs. But be aware that condoms made out of "natural materials," such as sheep intestine, do not protect against STIs.    ?Ask your doctor if there are any vaccines you should get. If you are 26 years old or younger, you can get a vaccine to protect against HPV, the virus that causes genital warts and cervical cancer. Some older people can also get the HPV vaccine. If you do not have hepatitis A or B and have not already gotten the vaccine for hepatitis A or B, you can get those vaccines, too.    ?If your partner has herpes, they can reduce the chances of infecting you by taking a medicine called valacyclovir (brand name: Valtrex).    ?If you are at very high risk of catching HIV, you might be able to take a pill every day to reduce the chances that you will get HIV. If you are interested in this, talk to your doctor.

## 2023-02-12 NOTE — ED PROVIDER NOTE - OBJECTIVE STATEMENT
PMHx anxiety presents requesting STD testing as her partner has been cheating on her. denies dysuria, vaginal discharge, dysparunia, vaginal bleeding, rash. denies STI in the past.

## 2023-02-12 NOTE — ED PROVIDER NOTE - CLINICAL SUMMARY MEDICAL DECISION MAKING FREE TEXT BOX
PMHx anxiety presents requesting STD testing. denies any symptoms. deferred exam. urine, labs sent. offered/advised emperic treatment but patient defers at this time.

## 2023-02-12 NOTE — ED PROVIDER NOTE - PATIENT PORTAL LINK FT
You can access the FollowMyHealth Patient Portal offered by [facility] by registering at the following website: [email or web address]. By joining [employer]’s FollowMyHealth portal, you will also be able to view your health information using other applications (apps) compatible with our system.

## 2023-02-13 LAB
C TRACH RRNA SPEC QL NAA+PROBE: SIGNIFICANT CHANGE UP
CULTURE RESULTS: SIGNIFICANT CHANGE UP
N GONORRHOEA RRNA SPEC QL NAA+PROBE: SIGNIFICANT CHANGE UP
SPECIMEN SOURCE: SIGNIFICANT CHANGE UP
SPECIMEN SOURCE: SIGNIFICANT CHANGE UP
T PALLIDUM AB TITR SER: NEGATIVE — SIGNIFICANT CHANGE UP

## 2023-02-14 DIAGNOSIS — F41.9 ANXIETY DISORDER, UNSPECIFIED: ICD-10-CM

## 2023-02-14 DIAGNOSIS — Z11.3 ENCOUNTER FOR SCREENING FOR INFECTIONS WITH A PREDOMINANTLY SEXUAL MODE OF TRANSMISSION: ICD-10-CM

## 2023-07-29 ENCOUNTER — EMERGENCY (EMERGENCY)
Facility: HOSPITAL | Age: 38
LOS: 1 days | Discharge: HOME | End: 2023-07-29
Attending: EMERGENCY MEDICINE | Admitting: EMERGENCY MEDICINE
Payer: MEDICAID

## 2023-07-29 VITALS
WEIGHT: 176.37 LBS | OXYGEN SATURATION: 98 % | SYSTOLIC BLOOD PRESSURE: 113 MMHG | TEMPERATURE: 99 F | DIASTOLIC BLOOD PRESSURE: 81 MMHG | RESPIRATION RATE: 16 BRPM | HEART RATE: 95 BPM | HEIGHT: 61 IN

## 2023-07-29 VITALS
RESPIRATION RATE: 18 BRPM | OXYGEN SATURATION: 98 % | DIASTOLIC BLOOD PRESSURE: 75 MMHG | SYSTOLIC BLOOD PRESSURE: 110 MMHG | TEMPERATURE: 98 F | HEART RATE: 76 BPM

## 2023-07-29 LAB
ALBUMIN SERPL ELPH-MCNC: 3.5 G/DL — SIGNIFICANT CHANGE UP (ref 3.4–5)
ALP SERPL-CCNC: 67 U/L — SIGNIFICANT CHANGE UP (ref 40–120)
ALT FLD-CCNC: 24 U/L — SIGNIFICANT CHANGE UP (ref 12–42)
ANION GAP SERPL CALC-SCNC: 10 MMOL/L — SIGNIFICANT CHANGE UP (ref 9–16)
APPEARANCE UR: ABNORMAL
AST SERPL-CCNC: 19 U/L — SIGNIFICANT CHANGE UP (ref 15–37)
BACTERIA # UR AUTO: PRESENT /HPF — SIGNIFICANT CHANGE UP
BASOPHILS # BLD AUTO: 0.03 K/UL — SIGNIFICANT CHANGE UP (ref 0–0.2)
BASOPHILS NFR BLD AUTO: 0.2 % — SIGNIFICANT CHANGE UP (ref 0–2)
BILIRUB SERPL-MCNC: 0.5 MG/DL — SIGNIFICANT CHANGE UP (ref 0.2–1.2)
BILIRUB UR-MCNC: NEGATIVE — SIGNIFICANT CHANGE UP
BUN SERPL-MCNC: 8 MG/DL — SIGNIFICANT CHANGE UP (ref 7–23)
CALCIUM SERPL-MCNC: 8.6 MG/DL — SIGNIFICANT CHANGE UP (ref 8.5–10.5)
CHLORIDE SERPL-SCNC: 103 MMOL/L — SIGNIFICANT CHANGE UP (ref 96–108)
CO2 SERPL-SCNC: 24 MMOL/L — SIGNIFICANT CHANGE UP (ref 22–31)
COD CRY URNS QL: PRESENT
COLOR SPEC: SIGNIFICANT CHANGE UP
COMMENT - URINE: SIGNIFICANT CHANGE UP
CREAT SERPL-MCNC: 0.83 MG/DL — SIGNIFICANT CHANGE UP (ref 0.5–1.3)
DIFF PNL FLD: NEGATIVE — SIGNIFICANT CHANGE UP
EGFR: 94 ML/MIN/1.73M2 — SIGNIFICANT CHANGE UP
EOSINOPHIL # BLD AUTO: 0.03 K/UL — SIGNIFICANT CHANGE UP (ref 0–0.5)
EOSINOPHIL NFR BLD AUTO: 0.2 % — SIGNIFICANT CHANGE UP (ref 0–6)
EPI CELLS # UR: PRESENT
GLUCOSE SERPL-MCNC: 81 MG/DL — SIGNIFICANT CHANGE UP (ref 70–99)
GLUCOSE UR QL: NEGATIVE MG/DL — SIGNIFICANT CHANGE UP
HCG UR QL: NEGATIVE — SIGNIFICANT CHANGE UP
HCT VFR BLD CALC: 41 % — SIGNIFICANT CHANGE UP (ref 34.5–45)
HGB BLD-MCNC: 13.6 G/DL — SIGNIFICANT CHANGE UP (ref 11.5–15.5)
IMM GRANULOCYTES NFR BLD AUTO: 0.4 % — SIGNIFICANT CHANGE UP (ref 0–0.9)
KETONES UR-MCNC: 80 MG/DL
LEUKOCYTE ESTERASE UR-ACNC: ABNORMAL
LYMPHOCYTES # BLD AUTO: 1.47 K/UL — SIGNIFICANT CHANGE UP (ref 1–3.3)
LYMPHOCYTES # BLD AUTO: 11.1 % — LOW (ref 13–44)
MCHC RBC-ENTMCNC: 31.6 PG — SIGNIFICANT CHANGE UP (ref 27–34)
MCHC RBC-ENTMCNC: 33.2 GM/DL — SIGNIFICANT CHANGE UP (ref 32–36)
MCV RBC AUTO: 95.1 FL — SIGNIFICANT CHANGE UP (ref 80–100)
MONOCYTES # BLD AUTO: 0.96 K/UL — HIGH (ref 0–0.9)
MONOCYTES NFR BLD AUTO: 7.2 % — SIGNIFICANT CHANGE UP (ref 2–14)
NEUTROPHILS # BLD AUTO: 10.76 K/UL — HIGH (ref 1.8–7.4)
NEUTROPHILS NFR BLD AUTO: 80.9 % — HIGH (ref 43–77)
NITRITE UR-MCNC: NEGATIVE — SIGNIFICANT CHANGE UP
NRBC # BLD: 0 /100 WBCS — SIGNIFICANT CHANGE UP (ref 0–0)
PH UR: 6.5 — SIGNIFICANT CHANGE UP (ref 5–8)
PLATELET # BLD AUTO: 218 K/UL — SIGNIFICANT CHANGE UP (ref 150–400)
POTASSIUM SERPL-MCNC: 3.4 MMOL/L — LOW (ref 3.5–5.3)
POTASSIUM SERPL-SCNC: 3.4 MMOL/L — LOW (ref 3.5–5.3)
PROT SERPL-MCNC: 7.2 G/DL — SIGNIFICANT CHANGE UP (ref 6.4–8.2)
PROT UR-MCNC: ABNORMAL MG/DL
RBC # BLD: 4.31 M/UL — SIGNIFICANT CHANGE UP (ref 3.8–5.2)
RBC # FLD: 12.7 % — SIGNIFICANT CHANGE UP (ref 10.3–14.5)
RBC CASTS # UR COMP ASSIST: 0 /HPF — SIGNIFICANT CHANGE UP (ref 0–4)
SODIUM SERPL-SCNC: 137 MMOL/L — SIGNIFICANT CHANGE UP (ref 132–145)
SP GR SPEC: 1.02 — SIGNIFICANT CHANGE UP (ref 1–1.03)
UROBILINOGEN FLD QL: 4 MG/DL (ref 0.2–1)
WBC # BLD: 13.3 K/UL — HIGH (ref 3.8–10.5)
WBC # FLD AUTO: 13.3 K/UL — HIGH (ref 3.8–10.5)
WBC UR QL: 1 /HPF — SIGNIFICANT CHANGE UP (ref 0–5)

## 2023-07-29 PROCEDURE — 99284 EMERGENCY DEPT VISIT MOD MDM: CPT

## 2023-07-29 RX ORDER — CEFUROXIME AXETIL 250 MG
1 TABLET ORAL
Qty: 6 | Refills: 0
Start: 2023-07-29 | End: 2023-07-31

## 2023-07-29 RX ORDER — KETOROLAC TROMETHAMINE 30 MG/ML
15 SYRINGE (ML) INJECTION ONCE
Refills: 0 | Status: DISCONTINUED | OUTPATIENT
Start: 2023-07-29 | End: 2023-07-29

## 2023-07-29 RX ORDER — CEFUROXIME AXETIL 250 MG
250 TABLET ORAL ONCE
Refills: 0 | Status: COMPLETED | OUTPATIENT
Start: 2023-07-29 | End: 2023-07-29

## 2023-07-29 RX ORDER — SODIUM CHLORIDE 9 MG/ML
1000 INJECTION INTRAMUSCULAR; INTRAVENOUS; SUBCUTANEOUS ONCE
Refills: 0 | Status: COMPLETED | OUTPATIENT
Start: 2023-07-29 | End: 2023-07-29

## 2023-07-29 RX ADMIN — SODIUM CHLORIDE 1000 MILLILITER(S): 9 INJECTION INTRAMUSCULAR; INTRAVENOUS; SUBCUTANEOUS at 14:32

## 2023-07-29 RX ADMIN — Medication 250 MILLIGRAM(S): at 16:41

## 2023-07-29 RX ADMIN — Medication 15 MILLIGRAM(S): at 14:32

## 2023-07-29 NOTE — ED PROVIDER NOTE - CLINICAL SUMMARY MEDICAL DECISION MAKING FREE TEXT BOX
39 yo woman with viral syndrome sx  labs ivf meds obs/reassess   - 37 yo woman with viral syndrome sx  labs ivf meds obs/reassess   -  labs reviewed - pt reassessed on multiple occasions.   progressive improvement throughout ed stay  ED evaluation and management discussed with the patient and family (if available) in detail.  Close PMD follow up encouraged.  Strict ED return instructions discussed in detail and patient given the opportunity to ask any questions about their discharge diagnosis and instructions. Patient verbalized understanding.

## 2023-07-29 NOTE — ED ADULT NURSE NOTE - CHIEF COMPLAINT QUOTE
Pt sent from [facility] for chest tightness, shortness of breath, sore throat, bilateral ear pains and chills since yesterday. Denies sick contacts. Given 1 neb treatment in the urgent care.

## 2023-07-29 NOTE — ED PROVIDER NOTE - NSFOLLOWUPINSTRUCTIONS_ED_ALL_ED_FT
take antibiotics as directed     stay well hydrated     [facility] will text you covid and flu test results     return to ER if symptoms worsen or for any other concern

## 2023-07-29 NOTE — ED PROVIDER NOTE - PATIENT PORTAL LINK FT
You can access the FollowMyHealth Patient Portal offered by [facility] by registering at the following website: [email or web address]. By joining [employer]’s FollowMyHealth portal, you will also be able to view your health information using other applications (apps) compatible with our system.

## 2023-07-29 NOTE — ED ADULT TRIAGE NOTE - CHIEF COMPLAINT QUOTE
Pt sent from [facility] for chest tightness, shortness of breath, sore throat, bilateral ear pains and chills since yesterday. Denies sick contacts. Given 1 neb treatment in the urgent care.

## 2023-07-29 NOTE — ED ADULT NURSE NOTE - OBJECTIVE STATEMENT
Pt sent from [facility] for chest tightness, shortness of breath, sore throat, bilateral ear pains and chills since yesterday. Denies sick contacts. Given 1 neb treatment in the urgent care.

## 2023-07-29 NOTE — ED PROVIDER NOTE - OBJECTIVE STATEMENT
37 yo woman to ER with her 5 year old son [place] [facility]  pt states all over body aches and chills x 2 days   no fever   + sore throat able to tolerate po  no cp no sob no abd pain   no n/v/d

## 2023-07-29 NOTE — ED ADULT NURSE NOTE - NSFALLUNIVINTERV_ED_ALL_ED
Bed/Stretcher in lowest position, wheels locked, appropriate side rails in place/Call bell, personal items and telephone in reach/Instruct patient to call for assistance before getting out of bed/chair/stretcher/Non-slip footwear applied when patient is off stretcher/[place] to call system/Physically safe environment - no spills, clutter or unnecessary equipment/Purposeful proactive rounding/Room/bathroom lighting operational, light cord in reach

## 2023-07-31 DIAGNOSIS — B34.9 VIRAL INFECTION, UNSPECIFIED: ICD-10-CM

## 2023-07-31 DIAGNOSIS — N39.0 URINARY TRACT INFECTION, SITE NOT SPECIFIED: ICD-10-CM

## 2023-07-31 DIAGNOSIS — R68.83 CHILLS (WITHOUT FEVER): ICD-10-CM

## 2024-06-04 NOTE — ED ADULT TRIAGE NOTE - PATIENT ON (OXYGEN DELIVERY METHOD)
Detail Level: Generalized
General Sunscreen Counseling: I recommended a broad spectrum sunscreen with a SPF of 30 or higher.  I explained that SPF 30 sunscreens block approximately 97 percent of the sun's harmful rays.  Sunscreens should be applied at least 15 minutes prior to expected sun exposure and then every 2 hours after that as long as sun exposure continues. If swimming or exercising sunscreen should be reapplied every 45 minutes to an hour after getting wet or sweating.  One ounce, or the equivalent of a shot glass full of sunscreen, is adequate to protect the skin not covered by a bathing suit.  Sun protective clothing can be used in lieu of sunscreen but must be worn the entire time you are exposed to the sun's rays.  Lip balm containing sunscreen is important as well.
room air

## 2024-06-24 NOTE — ED ADULT NURSE NOTE - CHPI ED NUR SYMPTOMS NEG
no abdominal distension/no blood in stool/no burning urination/no chills/no diarrhea/no dysuria/no fever/no hematuria
Opt out

## 2024-11-15 NOTE — ED ADULT NURSE NOTE - DOES PATIENT HAVE ADVANCE DIRECTIVE
Airway    Performed by: Vilma Thomas MD  Authorized by: Vilma Thomas MD    Final Airway Type:  Endotracheal airway  Final Endotracheal Airway*:  ETT  ETT Size (mm)*:  8.0  Cuff*:  Regular  Technique Used for Successful ETT Placement:  Direct laryngoscopy  Devices/Methods Used in Placement*:  Mask and Stylet  Intubation Procedure*:  Preoxygenation, ETCO2, Atraumatic, Dentition Unchanged and Pharynx Clear  Insertion Site:  Oral  Blade Type*:  MAC  Blade Size*:  4  Measured from*:  Lips  Secured at (cm)*:  22  Placement Verified by: auscultation and capnometry    Glottic View*:  1 - full view of glottis  Attempts*:  1  Ventilation Between Attempts:  None  Number of Other Approaches Attempted:  0   Patient Identified, Procedure confirmed, Emergency equipment available and Safety protocols followed  Location:  OR  Urgency:  Elective  Difficult Airway: No    Indications for Airway Management:  Anesthesia and airway protection  Spontaneous Ventilation: absent    Sedation Level:  Anesthetized  Mask Difficulty Assessment:  0 - not attempted  Start Time: 11/15/2024 12:11 PM      
No

## 2025-01-28 NOTE — ED ADULT NURSE NOTE - CAS DISCH CONDITION
Continue Regimen: Skin medicinals triple rosacea cream 3 times per week for maintenance Detail Level: Zone Stable

## 2025-04-25 NOTE — ED ADULT TRIAGE NOTE - PRO INTERPRETER NEED 2
-- DO NOT REPLY / DO NOT REPLY ALL --  -- This inbox is not monitored. If this was sent to the wrong provider or department, reroute message to JULIÁN jhaveri. --  -- Message is from [facility] Operations ([facility]) --    General Patient Message: patient is returning missed phone call form office  please advise and call back   Caller Information       Contact Date/Time Type Contact Phone/Fax    04/23/2025 03:52 PM CDT Phone (Outgoing)      Visit Closure Attempted -  ROUTINE REFERRAL FOR DX Mass of right chest wall  PLEASE ADVICE ON SCHEDULING  Referral sent to dept for review waiting for Dept to advice.    04/24/2025 08:23 AM CDT Phone (Outgoing)      Visit Closure Attempted -  ROUTINE REFERRAL FOR DX Mass of right chest wall PLEASE ADVICE ON SCHEDULING Sent to Dr Jessica jhaveri for scheduling guidelines Scheduling number [phone]    04/24/2025 01:50 PM CDT Phone (Incoming) tara lopez  [facility] [phone]    04/24/2025 03:57 PM CDT Phone (Outgoing) Eula Dominguez (Self) [phone] (M)    Visit Closure Attempted -  Writer called patient to cxl Dr Lopez appointment as it states in telephone encounter that patient needs to see Gen/Surg not Breast. Mail box is full unable to leave message will keep trying. Patient needs to be scheduled with Dr Carbajal message attached.    04/25/2025 12:15 PM CDT Phone (Outgoing) Eual Dominguez (Self) [phone] (M)    Visit Closure Attempted     04/25/2025 12:41 PM CDT Phone (Incoming) Eula Dominguez [phone]    04/25/2025 01:03 PM CDT Phone (Incoming) EULA (Self) [phone]            Alternative phone number:     Can a detailed message be left? No  Patient has been advised the message will be addressed within 2-3 business days.                 English

## 2025-07-10 NOTE — ED ADULT TRIAGE NOTE - PAIN: PRESENCE, MLM
Abdomen , soft, nontender, nondistended, no guarding or rigidity, no masses palpable, normal bowel sounds. Liver and Spleen, no hepatomegaly present, no hepatosplenomegaly, liver nontender, spleen not palpable
R flank/complains of pain/discomfort